# Patient Record
Sex: MALE | Race: WHITE | Employment: FULL TIME | URBAN - NONMETROPOLITAN AREA
[De-identification: names, ages, dates, MRNs, and addresses within clinical notes are randomized per-mention and may not be internally consistent; named-entity substitution may affect disease eponyms.]

---

## 2023-12-21 ENCOUNTER — HOSPITAL ENCOUNTER (EMERGENCY)
Age: 43
Discharge: HOME | End: 2023-12-21
Payer: COMMERCIAL

## 2023-12-21 VITALS
DIASTOLIC BLOOD PRESSURE: 96 MMHG | SYSTOLIC BLOOD PRESSURE: 128 MMHG | RESPIRATION RATE: 18 BRPM | BODY MASS INDEX: 33.33 KG/M2 | TEMPERATURE: 98.4 F | WEIGHT: 225 LBS | HEART RATE: 108 BPM | HEIGHT: 69 IN | OXYGEN SATURATION: 97 %

## 2023-12-21 DIAGNOSIS — U07.1 COVID-19 VIRUS INFECTION: Primary | ICD-10-CM

## 2023-12-21 LAB — SARS-COV-2 RDRP RESP QL NAA+PROBE: DETECTED

## 2023-12-21 PROCEDURE — 99202 OFFICE O/P NEW SF 15 MIN: CPT

## 2023-12-21 PROCEDURE — 99212 OFFICE O/P EST SF 10 MIN: CPT | Performed by: EMERGENCY MEDICINE

## 2023-12-21 PROCEDURE — 87635 SARS-COV-2 COVID-19 AMP PRB: CPT

## 2023-12-21 ASSESSMENT — PAIN - FUNCTIONAL ASSESSMENT: PAIN_FUNCTIONAL_ASSESSMENT: NONE - DENIES PAIN

## 2023-12-21 NOTE — ED NOTES
Pt with complaints of a cough, headache, body aches and sore throat that started yesterday. Denies being around anyone ill. States cough is productive with clear mucus. States decongestants helped a little bit.      Tarah Perez, MELCHOR  [id number]

## 2023-12-21 NOTE — DISCHARGE INSTRUCTIONS
Drink plenty of fluids    Tylenol/ibuprofen, alternate every 3 hours as needed for fever or bodyaches    Over-the-counter decongestants may be helpful    Rest but ambulate frequently to expand your lungs    Return for new or worsening symptoms